# Patient Record
(demographics unavailable — no encounter records)

---

## 2025-01-28 NOTE — PHYSICAL EXAM
[Normal] : mucosa is normal [Midline] : trachea located in midline position [Removed] : palatine tonsils previously removed [Nasal Endoscopy Performed] : nasal endoscopy was performed, see procedure section for findings

## 2025-01-28 NOTE — PROCEDURE
[Recalcitrant Symptoms] : recalcitrant symptoms  [Anterior rhinoscopy insufficient to account for symptoms] : anterior rhinoscopy insufficient to account for symptoms [Flexible Endoscope] : examined with the flexible endoscope [Congested] : congested [Daily] : daily [Normal] : the paranasal sinuses had no abnormalities

## 2025-01-28 NOTE — HISTORY OF PRESENT ILLNESS
[de-identified] : Patient presents for initial evaluation for sneezing. Accompanied by father. He has constant sneezing with white mucus. Had allergy testing, was told to have environmental and animal allergies. Started taking allergy shots for 1.5 years with Dr. Goins. However, no improvement. Hx tonsillectomy. Taking Zyrtec as needed when he has reaction to shots. Was given 2 nasal sprays with temporary improvement. Denies snoring. Denies recurrent ear infections. States that he breathes through mouth at times. Reports taking Allegra and Zyrtec regularly as a child with improvement of symptoms.

## 2025-03-11 NOTE — HISTORY OF PRESENT ILLNESS
[FreeTextEntry1] :  Patient returns following up c/o Sneezing, Nasal obstruction. Reports minor improvement since last appointment. States prescribed medication was helping but once pt finished the medication, the symptoms came back.  Complains of white mucus.  Mentions pt was sick last week.  No further complaints or concerns. Accompanied by dad

## 2025-03-11 NOTE — REASON FOR VISIT
[Subsequent Evaluation] : a subsequent evaluation for [FreeTextEntry2] : Sneezing, Nasal obstruction

## 2025-03-11 NOTE — ASSESSMENT
[FreeTextEntry1] : Patient better with zyrtec and flonase as well as allergy shots. Patient advised to continue